# Patient Record
Sex: FEMALE | Race: WHITE | NOT HISPANIC OR LATINO | Employment: FULL TIME | URBAN - METROPOLITAN AREA
[De-identification: names, ages, dates, MRNs, and addresses within clinical notes are randomized per-mention and may not be internally consistent; named-entity substitution may affect disease eponyms.]

---

## 2022-04-10 ENCOUNTER — HISTORICAL (OUTPATIENT)
Dept: ADMINISTRATIVE | Facility: HOSPITAL | Age: 42
End: 2022-04-10
Payer: COMMERCIAL

## 2022-04-30 VITALS
BODY MASS INDEX: 28.98 KG/M2 | WEIGHT: 169.75 LBS | DIASTOLIC BLOOD PRESSURE: 90 MMHG | SYSTOLIC BLOOD PRESSURE: 112 MMHG | HEIGHT: 64 IN

## 2022-10-19 ENCOUNTER — OFFICE VISIT (OUTPATIENT)
Dept: NEUROLOGY | Facility: CLINIC | Age: 42
End: 2022-10-19
Payer: COMMERCIAL

## 2022-10-19 VITALS
BODY MASS INDEX: 30.05 KG/M2 | SYSTOLIC BLOOD PRESSURE: 102 MMHG | DIASTOLIC BLOOD PRESSURE: 80 MMHG | HEART RATE: 60 BPM | HEIGHT: 64 IN | WEIGHT: 176 LBS

## 2022-10-19 DIAGNOSIS — G70.00 MG (MYASTHENIA GRAVIS): Primary | ICD-10-CM

## 2022-10-19 PROCEDURE — 99999 PR PBB SHADOW E&M-EST. PATIENT-LVL III: CPT | Mod: PBBFAC,,, | Performed by: PSYCHIATRY & NEUROLOGY

## 2022-10-19 PROCEDURE — 99999 PR PBB SHADOW E&M-EST. PATIENT-LVL III: ICD-10-PCS | Mod: PBBFAC,,, | Performed by: PSYCHIATRY & NEUROLOGY

## 2022-10-19 PROCEDURE — 99213 PR OFFICE/OUTPT VISIT, EST, LEVL III, 20-29 MIN: ICD-10-PCS | Mod: S$GLB,,, | Performed by: PSYCHIATRY & NEUROLOGY

## 2022-10-19 PROCEDURE — 99213 OFFICE O/P EST LOW 20 MIN: CPT | Mod: S$GLB,,, | Performed by: PSYCHIATRY & NEUROLOGY

## 2022-10-19 RX ORDER — LEVOCETIRIZINE DIHYDROCHLORIDE 5 MG/1
10 TABLET, FILM COATED ORAL
COMMUNITY
Start: 2022-07-19

## 2022-10-19 NOTE — PROGRESS NOTES
Chief Complaint   Patient presents with    Myasthenia Gravis     Pt states pitsosis has been better since last visit denies changes with vision; some HA's denies being frequent; issue with fluorescent lights still current; taking mestinon as needed        This is a 42 y.o. female here for follow up for MG.  She is doing well. She denies any dysphasia, dyspnea, or arm weakness. Occasional difficulty swallowing after a shift at work. She does have intermittent ptosis as well as diplopia. She takes Mestinon usually when she works which is on the weekend Friday Saturdays and Sundays or if she notices symptoms worsen. She has extreme photophobia to fluorescent lights as well as sunlight. Will trigger ptosis at times.    Has had a thymectomy    [place] [facility] [place]    Medication List with Changes/Refills   Current Medications    LEVOCETIRIZINE (XYZAL) 5 MG TABLET    Take 10 mg by mouth once daily.    PYRIDOSTIGMINE (MESTINON) 60 MG TAB    Take 60 mg by mouth every 6 (six) hours.   Discontinued Medications    DEXILANT 60 MG CAPSULE    Take 1 tablet by mouth Once daily as needed.    NORETHINDRONE-ETHINYL ESTRADIOL-IRON (MICROGESTIN FE1.5/30) 1.5-30 MG-MCG TABLET    Take 1 tablet by mouth once daily.    OXYCODONE-ACETAMINOPHEN 5-325 MG (PERCOCET) 5-325 MG PER TABLET    Take 2 tablets by mouth every 4 (four) hours as needed.        Vitals:    10/19/22 0903   BP: 102/80   Pulse: 60        General: alert and oriented, no acute distress, no audible wheezes, pulse intact, no edema    Cognition and Comprehension  Speech and language intact  Follows commands  Speech fluent  Attention intact  Memory for recent events intact from history taking  Affect pleasant  Fund of knowledge adequate    Cranial nerves  II. Optic: Visual fields full to confrontation both eyes  III, IV, VI. Oculomotor: Intact, Pupils equal, round and reactive to light, no nystagmus  V. Trigeminal: sensation to light touch normal  VII. No facial asymmetry or facial  weakness  VIII. Hearing intact to spoken voice  IX/X. Glossopharyngeal/Vagus: Voice normal, palate rises symmetrically  XI. Axillary: Shoulder shrug normal  XII. Hypoglossal: Intact    Muscle Strength and Tone  Normal upper extremity tone  Normal lower extremity tone  Normal upper extremity strength  Normal lower extremity strength    Sensation  Intact to light touch and temperature    Reflexes  Normal and symmetric    Coordination and Gait  Finger to nose normal  Gait normal     1. MG (myasthenia gravis)     Stable  No recent flare ups  Cont mestinon 60 TID

## 2023-10-25 ENCOUNTER — OFFICE VISIT (OUTPATIENT)
Dept: NEUROLOGY | Facility: CLINIC | Age: 43
End: 2023-10-25
Payer: COMMERCIAL

## 2023-10-25 VITALS
SYSTOLIC BLOOD PRESSURE: 110 MMHG | BODY MASS INDEX: 30.3 KG/M2 | WEIGHT: 171 LBS | HEART RATE: 66 BPM | DIASTOLIC BLOOD PRESSURE: 74 MMHG | HEIGHT: 63 IN

## 2023-10-25 DIAGNOSIS — G70.00 MG (MYASTHENIA GRAVIS): Primary | ICD-10-CM

## 2023-10-25 PROCEDURE — 99999 PR PBB SHADOW E&M-EST. PATIENT-LVL III: ICD-10-PCS | Mod: PBBFAC,,, | Performed by: PSYCHIATRY & NEUROLOGY

## 2023-10-25 PROCEDURE — 99213 OFFICE O/P EST LOW 20 MIN: CPT | Mod: S$GLB,,, | Performed by: PSYCHIATRY & NEUROLOGY

## 2023-10-25 PROCEDURE — 99999 PR PBB SHADOW E&M-EST. PATIENT-LVL III: CPT | Mod: PBBFAC,,, | Performed by: PSYCHIATRY & NEUROLOGY

## 2023-10-25 PROCEDURE — 99213 PR OFFICE/OUTPT VISIT, EST, LEVL III, 20-29 MIN: ICD-10-PCS | Mod: S$GLB,,, | Performed by: PSYCHIATRY & NEUROLOGY

## 2023-10-25 RX ORDER — CITALOPRAM 20 MG/1
20 TABLET, FILM COATED ORAL NIGHTLY
COMMUNITY

## 2023-10-25 NOTE — PROGRESS NOTES
Chief Complaint   Patient presents with    Myasthenia gravis     States everything is good. Denies any SOB, dysphagia, or eyelid drooping. Occasionally still has the double vision.        This is a 43 y.o. female here for follow up for myasthenia gravis.  She is doing well.  She denies any dysphagia, dyspnea, arm weakness.  She does have intermittent diplopia sometimes while at work.  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  She is not really taking the Mestinon.  Recall patient has had a thymectomy, photophobia and fluorescent lights trigger ptosis at times.  Is on Celexa now for anxiety/stress.    Medication List with Changes/Refills   Current Medications    CITALOPRAM (CELEXA) 20 MG TABLET    Take 20 mg by mouth every evening.    LEVOCETIRIZINE (XYZAL) 5 MG TABLET    Take 10 mg by mouth as needed.    PYRIDOSTIGMINE (MESTINON) 60 MG TAB    Take 60 mg by mouth every 6 (six) hours.        Vitals:    10/25/23 0859   BP: 110/74   Pulse: 66        General: alert and oriented, no acute distress, no audible wheezes, pulse intact, no edema    Cognition and Comprehension  Speech and language intact  Follows commands  Speech fluent  Attention intact  Memory for recent events intact from history taking  Affect pleasant  Fund of knowledge adequate    Cranial nerves  II. Optic: Visual fields full to confrontation both eyes  III, IV, VI. Oculomotor: Intact, Pupils equal, round and reactive to light, no nystagmus  V. Trigeminal: sensation to light touch normal  VII. No facial asymmetry or facial weakness  VIII. Hearing intact to spoken voice  IX/X. Glossopharyngeal/Vagus: Voice normal, palate rises symmetrically  XI. Axillary: Shoulder shrug normal  XII. Hypoglossal: Intact    Muscle Strength and Tone  Normal upper extremity tone  Normal lower extremity tone  Normal upper extremity strength  Normal lower extremity strength        Coordination and Gait  Finger to nose normal  Gait normal     1. MG (myasthenia gravis)       Patient stable,  recommend mestinon but understand hard to comply

## 2024-12-04 ENCOUNTER — OFFICE VISIT (OUTPATIENT)
Dept: NEUROLOGY | Facility: CLINIC | Age: 44
End: 2024-12-04
Payer: COMMERCIAL

## 2024-12-04 VITALS
HEIGHT: 63 IN | SYSTOLIC BLOOD PRESSURE: 117 MMHG | BODY MASS INDEX: 30.01 KG/M2 | HEART RATE: 57 BPM | DIASTOLIC BLOOD PRESSURE: 84 MMHG | WEIGHT: 169.38 LBS

## 2024-12-04 DIAGNOSIS — G70.00 MYASTHENIA GRAVIS, ACETYLCHOLINE RECEPTOR ANTIBODY POSITIVE: ICD-10-CM

## 2024-12-04 DIAGNOSIS — G70.00 MG (MYASTHENIA GRAVIS): Primary | ICD-10-CM

## 2024-12-04 DIAGNOSIS — G70.00 MYASTHENIA GRAVIS STATUS POST THYMECTOMY: ICD-10-CM

## 2024-12-04 DIAGNOSIS — Z90.89 MYASTHENIA GRAVIS STATUS POST THYMECTOMY: ICD-10-CM

## 2024-12-04 PROCEDURE — 99999 PR PBB SHADOW E&M-EST. PATIENT-LVL III: CPT | Mod: PBBFAC,,, | Performed by: PSYCHIATRY & NEUROLOGY

## 2024-12-04 PROCEDURE — 99213 OFFICE O/P EST LOW 20 MIN: CPT | Mod: S$GLB,,, | Performed by: PSYCHIATRY & NEUROLOGY

## 2024-12-04 NOTE — PROGRESS NOTES
Chief Complaint   Patient presents with    MG     Pt states doing well denies worsening double vision denies worsening issues         This is a 44 y.o. female here for follow up for myasthenia gravis.  She is doing well.  She denies any recent flare-ups.  Denies diplopia or dysarthria.  She takes Mestinon only as needed    Medication List with Changes/Refills   Current Medications    CITALOPRAM (CELEXA) 20 MG TABLET    Take 20 mg by mouth every evening.    LEVOCETIRIZINE (XYZAL) 5 MG TABLET    Take 10 mg by mouth as needed.    PYRIDOSTIGMINE (MESTINON) 60 MG TAB    Take 60 mg by mouth every 6 (six) hours.        Vitals:    12/04/24 0844   BP: 117/84   Pulse: (!) 57        General: alert and oriented, no acute distress, no audible wheezes, pulse intact, no edema    Cognition and Comprehension  Speech and language intact  Follows commands  Speech fluent  Attention intact  Memory for recent events intact from history taking  Affect pleasant  Fund of knowledge adequate    Cranial nerves  II. Optic: Visual fields full to confrontation both eyes  III, IV, VI. Oculomotor: Intact, Pupils equal, round and reactive to light, no nystagmus  V. Trigeminal: sensation to light touch normal  VII. No facial asymmetry or facial weakness  VIII. Hearing intact to spoken voice  IX/X. Glossopharyngeal/Vagus: Voice normal, palate rises symmetrically  XI. Axillary: Shoulder shrug normal  XII. Hypoglossal: Intact    Muscle Strength and Tone  Normal upper extremity tone  Normal lower extremity tone  Normal upper extremity strength  Normal lower extremity strength    Sensation  Intact to light touch and temperature    Reflexes  Normal and symmetric    Coordination and Gait  Finger to nose normal  Gait normal     1. MG (myasthenia gravis)    2. Myasthenia gravis status post thymectomy    3. Myasthenia gravis, acetylcholine receptor antibody positive       Status post thymectomy and doing well.  Seems to be in remission.